# Patient Record
Sex: MALE | Race: OTHER
[De-identification: names, ages, dates, MRNs, and addresses within clinical notes are randomized per-mention and may not be internally consistent; named-entity substitution may affect disease eponyms.]

---

## 2023-03-06 ENCOUNTER — HOSPITAL ENCOUNTER (EMERGENCY)
Dept: HOSPITAL 54 - ER | Age: 28
Discharge: HOME | End: 2023-03-06
Payer: COMMERCIAL

## 2023-03-06 VITALS — DIASTOLIC BLOOD PRESSURE: 75 MMHG | SYSTOLIC BLOOD PRESSURE: 130 MMHG

## 2023-03-06 VITALS — WEIGHT: 165 LBS | BODY MASS INDEX: 27.49 KG/M2 | HEIGHT: 65 IN

## 2023-03-06 DIAGNOSIS — A93.8: Primary | ICD-10-CM

## 2023-03-06 DIAGNOSIS — Z79.899: ICD-10-CM

## 2023-03-06 NOTE — NUR
PT WALKED INTO ER C/O BILATERAL LOWER LEG BUG BITES SEEN 4 DAYS AGO. PT 
AMBULATED TO ROOM WITH STEADY GAIT, BREATHING EVEN AND UNLABORED. PT DENIES 
CHILLS, SOB OR CP. AWAITING MD ORDERS.